# Patient Record
Sex: MALE | Race: WHITE
[De-identification: names, ages, dates, MRNs, and addresses within clinical notes are randomized per-mention and may not be internally consistent; named-entity substitution may affect disease eponyms.]

---

## 2020-02-07 ENCOUNTER — HOSPITAL ENCOUNTER (OUTPATIENT)
Dept: HOSPITAL 88 - NM | Age: 80
End: 2020-02-07
Attending: INTERNAL MEDICINE
Payer: MEDICARE

## 2020-02-07 DIAGNOSIS — R07.9: ICD-10-CM

## 2020-02-07 DIAGNOSIS — I25.10: Primary | ICD-10-CM

## 2020-02-07 PROCEDURE — 93017 CV STRESS TEST TRACING ONLY: CPT

## 2020-02-07 PROCEDURE — 78452 HT MUSCLE IMAGE SPECT MULT: CPT

## 2020-02-07 NOTE — MYOVIEW STRESS TEST
DATE OF STUDY:  02/07/2020 09:07:00

 

Stress Test - Treadmill ONLY

 

STRESS SUMMARY:  The patient underwent pharmacologic stress testing utilizing the usual

protocol for Lexiscan.  Baseline heart rate was 67 beats per minute and increased to a

maximum heart rate of 86 beats per minute.  Resting blood pressure was 129/69 and

remained stable throughout the stress protocol.  The patient was in no cardiac symptoms

throughout the stress protocol. 

 

ELECTROCARDIOGRAPHIC STRESS SUMMARY:  Baseline 12-lead electrocardiogram showed normal

sinus rhythm with nonspecific ST-T wave abnormalities.  Throughout the stress protocol,

the patient had elicited premature ventricular complexes.  There was no evidence of ST

changes throughout the stress protocol. 

 

MYOCARDIAL PERFUSION IMAGES:  The patient received technetium-99m tetrofosmin with 11

mCi at rest and 32.2 mCi at stress.  The perfusion images show a moderate-sized fixed

defect in the inferior wall that improves with stress images.  Gated images reveal left

ventricular ejection fraction of 61%. 

 

CONCLUSIONS:  

1. Normal clinical, hemodynamic, and electrocardiographic Lexiscan stress test.

2. Premature ventricular complexes noted throughout the stress protocol.

3. Myocardial perfusion images revealed moderate-size inferior attenuation artifact,

however, 

cannot rule out nontransmural scar.

4. Normal left ventricular function with an estimated ejection fraction of 61%.

 

 

 

 

______________________________

DO MARYANNE Mcnair/LONL

D:  02/07/2020 15:54:51

T:  02/07/2020 18:38:42

Job #:  [id number]

## 2020-02-20 LAB
BASOPHILS # BLD AUTO: 0.1 10*3/UL (ref 0–0.1)
BASOPHILS NFR BLD AUTO: 0.5 % (ref 0–1)
DEPRECATED NEUTROPHILS # BLD AUTO: 7.5 10*3/UL (ref 2.1–6.9)
EOSINOPHIL # BLD AUTO: 0 10*3/UL (ref 0–0.4)
EOSINOPHIL NFR BLD AUTO: 0.3 % (ref 0–6)
ERYTHROCYTE [DISTWIDTH] IN CORD BLOOD: 13.9 % (ref 11.7–14.4)
HCT VFR BLD AUTO: 41.4 % (ref 38.2–49.6)
HGB BLD-MCNC: 13.8 G/DL (ref 14–18)
LYMPHOCYTES # BLD: 2.6 10*3/UL (ref 1–3.2)
LYMPHOCYTES NFR BLD AUTO: 24.1 % (ref 18–39.1)
MCH RBC QN AUTO: 30.8 PG (ref 28–32)
MCHC RBC AUTO-ENTMCNC: 33.3 G/DL (ref 31–35)
MCV RBC AUTO: 92.4 FL (ref 81–99)
MONOCYTES # BLD AUTO: 0.7 10*3/UL (ref 0.2–0.8)
MONOCYTES NFR BLD AUTO: 6.3 % (ref 4.4–11.3)
NEUTS SEG NFR BLD AUTO: 68.5 % (ref 38.7–80)
PLATELET # BLD AUTO: 294 X10E3/UL (ref 140–360)
RBC # BLD AUTO: 4.48 X10E6/UL (ref 4.3–5.7)

## 2020-02-20 NOTE — DIAGNOSTIC IMAGING REPORT
EXAMINATION:  CHEST 2 VIEWS    



INDICATION:      

^PREOP

^[id number]

^0956





COMPARISON:  None

     

FINDINGS:  PA and lateral views



TUBES and LINES:  None.



LUNGS: Linear consolidation of the lung bases, right greater than left, likely

atelectasis and/or scarring. Small left apical calcified granuloma. No

concerning mass or consolidation.



PLEURA:  No pleural effusion or pneumothorax.



HEART AND MEDIASTINUM: Normal heart size. Normal pulmonary vasculature.

Tortuous and ectatic thoracic aorta.



BONES AND SOFT TISSUES:  No acute osseous lesion.  Sternotomy wires and

mediastinal surgical clips.



UPPER ABDOMEN: No free air under the diaphragm. Elevation of the right

hemidiaphragm.



IMPRESSION: 



1. Bibasilar atelectasis and/or scarring. No acute thoracic abnormality.



2. Nonspecific elevation of the right hemidiaphragm.





Signed by: Srinath Calvo MD on 2/20/2020 10:09 AM

## 2020-02-25 ENCOUNTER — HOSPITAL ENCOUNTER (OUTPATIENT)
Dept: HOSPITAL 88 - OR | Age: 80
Discharge: HOME | End: 2020-02-25
Attending: PLASTIC SURGERY
Payer: MEDICARE

## 2020-02-25 VITALS — SYSTOLIC BLOOD PRESSURE: 96 MMHG | DIASTOLIC BLOOD PRESSURE: 65 MMHG

## 2020-02-25 DIAGNOSIS — Z95.1: ICD-10-CM

## 2020-02-25 DIAGNOSIS — I10: ICD-10-CM

## 2020-02-25 DIAGNOSIS — Z01.812: ICD-10-CM

## 2020-02-25 DIAGNOSIS — Z85.828: ICD-10-CM

## 2020-02-25 DIAGNOSIS — I49.3: ICD-10-CM

## 2020-02-25 DIAGNOSIS — I25.810: ICD-10-CM

## 2020-02-25 DIAGNOSIS — Z79.82: ICD-10-CM

## 2020-02-25 DIAGNOSIS — Z48.3: Primary | ICD-10-CM

## 2020-02-25 DIAGNOSIS — H91.90: ICD-10-CM

## 2020-02-25 DIAGNOSIS — Z01.818: ICD-10-CM

## 2020-02-25 PROCEDURE — 14301 TIS TRNFR ANY 30.1-60 SQ CM: CPT

## 2020-02-25 PROCEDURE — 82948 REAGENT STRIP/BLOOD GLUCOSE: CPT

## 2020-02-25 PROCEDURE — 71046 X-RAY EXAM CHEST 2 VIEWS: CPT

## 2020-02-25 PROCEDURE — 14302 TIS TRNFR ADDL 30 SQ CM: CPT

## 2020-02-25 PROCEDURE — 36415 COLL VENOUS BLD VENIPUNCTURE: CPT

## 2020-02-25 PROCEDURE — 85025 COMPLETE CBC W/AUTO DIFF WBC: CPT

## 2020-02-25 NOTE — XMS REPORT
Summary of Care

                             Created on: 2020



Oh Andrews

External Reference #: [id number]

: 1940

Sex: Male



Demographics







                          Address                   [address] [place] [address] 

[place]  [address]

 

                          Home Phone                [phone]

 

                          Preferred Language        English

 

                          Marital Status            

 

                          Nondenominational Affiliation     Unknown

 

                          Race                      White

 

                          Ethnic Group              Non-





Author







                          Author                    [facility]

 

                          Organization              [facility]

 

                          Address                   Unknown

 

                          Phone                     Unavailable







Support







                Name            Relationship    Address         Phone

 

                Susie Herrera    ECON            Unknown         [phone]







Care Team Providers







                    Care Team Member Name    Role                Phone

 

                    Elaine Koo MD    PCP                 [phone]







Encounter Details







                          Care Team                 Description



                     Date                Type                Department  

 

                                        



Doctor Unassigned, [facility]



[address] [place] [address]                      



                     2019          Orders Only         [facility]  



                                         [address] [place] [address]  







Allergies

No Known Allergiesdocumented as of this encounter (statuses as of 2020)



Medications

No known medicationsdocumented as of this encounter (statuses as of 2020)



Active Problems





No known active problemsdocumented as of this encounter (statuses as of 
2020)



Social History







                                        Date



                 Tobacco Use     Types           Packs/Day       Years Used 

 

                                         



                                         Never Assessed    









 



                           Sex Assigned at Birth     Date Recorded

 

 



                                         Not on file 









                                        Industry



                           Job Start Date            Occupation 

 

                                        Not on file



                           Not on file               Not on file 









                                        Travel End



                           Travel History            Travel Start 

 





                                         No recent travel history available.



documented as of this encounter



Last Filed Vital Signs

Not on filedocumented in this encounter



Plan of Treatment







   



                 Health Maintenance     Due Date        Last Done       Comments

 

   



                           DTaP,Tdap,and Td Vaccines     1952  



                                         (1 - Tdap)   

 

   



                           Zoster Recombinant        1991  



                                         Vaccine (SHINGRIX) (1 of   



                                         2)   

 

   



                           Medicare Wellness Visit     2006  

 

   



                           PNEUMOCOCCAL VACCINES 65+     2006  



                                         (1 of 2 - PCV13)   

 

   



                           INFLUENZA VACCINE (#1)     2019  



documented as of this encounter



Procedures







                                        Comments



                 Procedure Name     Priority        Date/Time       Associated Diagnosis 

 

                                         



                     EXTERNAL PROVIDER RECORDS     Routine             2019  



                                         12:01 AM CST  



documented in this encounter



Results

Not on filedocumented in this encounter

## 2020-02-25 NOTE — XMS REPORT
Summary of Care

                             Created on: 2020



Oh Andrews

External Reference #: [id number]

: 1940

Sex: Male



Demographics







                          Address                   [address] [place]  [address]

 

                          Home Phone                [phone]

 

                          Preferred Language        English

 

                          Marital Status            

 

                          Jew Affiliation     Unknown

 

                          Race                      White

 

                          Ethnic Group              Non-





Author







                          Author                    [facility]

 

                          Organization              [facility]

 

                          Address                   Unknown

 

                          Phone                     Unavailable







Support







                Name            Relationship    Address         Phone

 

                Susie ZHU            Unknown         [phone]







Care Team Providers







                    Care Team Member Name    Role                Phone

 

                    Elaine Koo MD    PCP                 [phone]







Reason for Visit

* 





 



                           Reason                    Comments

 

 



                           New Patient               scalp cancer





*  (Routine)





                          Referred By Contact       Referred To Contact



                 Status          Reason          Specialty       Diagnoses /  



                                         Procedures  

 

                                        



Sher Hilario



[address]



[place] [address]



Phone: [phone]



Fax: [phone]                       



[place] Jackson MD



[address] [facility] [id number] [place] [address]



Phone: [phone]



Fax: [phone]



                     Authorized          [facility]     Diagnoses  



                           RECONSTRUCTIVE            Squamous cell  



                           SURGERY /                 carcinoma of skin,  



                           Plastic Surgery           unspecified  



                                         Eval for wound  



                                         closure - per  



                                         External referral  



                                         from Dr. Sher Hilario - [facility] for  



                                         pt asking who is  



                                         PCP is for  



                                         referral.

  



                                         P  



                                         jacky  



                                         CONSULT PLASTIC  



                                         SURGERY  



                                         NEW VISIT (FIRST  



                                         TIME)  











Encounter Details







                          Care Team                 Description



                     Date                Type                Department  

 

                                        



[place] Jackson MD



[address] [facility] [id number] [place] [address] [phone] (Fax)                      Mohs defect of scalp (Primary Dx)



                     01/15/2020          Office Visit        [facility] Plastic  



                                         Surgery- [facility]  



                                         [address]  



                                         [place], [place]  



                                         [address] [phone]  







Allergies

No Known Allergiesdocumented as of this encounter (statuses as of 2020)



Medications







                          End Date                  Status



              Medication     Sig          Dispensed     Refills      Start  



                                         Date  

 

                                                    Active



                     ramipril 10 mg capsule     Take 10 mg by       0   



                                         mouth daily.     

 

                                                    Active



                     rosuvastatin 5 mg tablet     Take 5 mg by        0   



                                         mouth daily.     

 

                                                    Active



                     ezetimibe 10 mg tablet     Take 10 mg by       0   



                                         mouth daily.     

 

                                                    Active



                     aspirin (ASPIRIN LOW     Take 81 mg by       0   



                           DOSE) 81 mg EC tablet     mouth daily.     



documented as of this encounter (statuses as of 2020)



Active Problems





Not on filedocumented as of this encounter (statuses as of 2020)



Social History







                                        Date



                 Tobacco Use     Types           Packs/Day       Years Used 

 

                                         



                                         Never Smoker    

 

    



                                         Smokeless Tobacco: Never   



                                         Used   









                    Drinks/Week         oz/Week             Comments



                                         Alcohol Use   

 

                                                             



                                         Not Currently   









 



                           Sex Assigned at Birth     Date Recorded

 

 



                                         Not on file 









                                        Industry



                           Job Start Date            Occupation 

 

                                        Not on file



                           Not on file               Not on file 









                                        Travel End



                           Travel History            Travel Start 

 





                                         No recent travel history available.



documented as of this encounter



Last Filed Vital Signs







                    Reading             Time Taken          Comments



                                         Vital Sign   

 

                    126/78              01/15/2020  3:18 PM CST     



                                         Blood Pressure   

 

                    85                  01/15/2020  3:18 PM CST     



                                         Pulse   

 

                    37.4 C (99.3 F)    01/15/2020  3:18 PM CST     



                                         Temperature   

 

                    18                  01/15/2020  3:18 PM CST     



                                         Respiratory Rate   

 

                    95%                 01/15/2020  3:18 PM CST     



                                         Oxygen Saturation   

 

                    -                   -                    



                                         Inhaled Oxygen   



                                         Concentration   

 

                    87.9 kg (193 lb 11.2 oz)    01/15/2020  3:18 PM CST     



                                         Weight   

 

                    180.3 cm (5' 11")    01/15/2020  3:18 PM CST     



                                         Height   

 

                    27.02               01/15/2020  3:18 PM CST     



                                         Body Mass Index   



documented in this encounter



Progress Notes

* Mike Sky MD - 01/15/2020  2:45 PM CST



Formatting of this note might be different from the original.

PLASTIC SURGERY CLINIC NOTE

NAME: Oh Andrews 

MRN: [id number]



Date of Service: 1/15/2020



CC: MOH's defect of scalp



SUBJECT:

Oh Andrews is a 79 year old male who presents today with a MOH's defect of 
the scalp. 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
jose alberto Hilario. The MOH's surgeon attempted to close the scalp defect with a sma
ll open area along the central aspect but following suture removal on 2020
the entire incision dehisced. Following the dehiscence, he has been applying Ne
osporin to the open area and covering it with band aids. Dr. Hilario subsequently
referred the patient for definitive closure. Denies any fevers, chills, nausea 
or vomiting. Of note, the patient currently takes ASA daily. 



CURRENT HOSPITAL MEDICATIONS

Current Outpatient Medications on File Prior to Visit 

Medication Sig Dispense Refill 

 aspirin (ASPIRIN LOW DOSE) 81 mg EC tablet Take 81 mg by mouth daily.   

 ezetimibe 10 mg tablet Take 10 mg by mouth daily.   

 ramipril 10 mg capsule Take 10 mg by mouth daily.   

 rosuvastatin 5 mg tablet Take 5 mg by mouth daily.   



No current facility-administered medications on file prior to visit.  



HISTORY

Past Surgical History: 

Procedure Laterality Date 

 CORONARY ARTERY BYPASS GRAFT   

 6 vessel bypass 

 All other past surgical history non contributory to this encounter.

Past Medical History: 

Diagnosis Date 

 CAD (coronary artery disease)  

 All other past medical history non contributory to this encounter.

Family History 

Problem Relation Age of Onset 

 Stroke Mother  

 All other family history non contributory to this encounter.

Social History 



Socioeconomic History 

 Marital status:  

  Spouse name: Not on file 

 Number of children: Not on file 

 Years of education: Not on file 

 Highest education level: Not on file 

Occupational History 

 Not on file 

Social Needs 

 Financial resource strain: Not on file 

 Food insecurity: 

  Worry: Not on file 

  Inability: Not on file 

 Transportation needs: 

  Medical: Not on file 

  Non-medical: Not on file 

Tobacco Use 

 Smoking status: Never Smoker 

 Smokeless tobacco: Never Used 

Substance and Sexual Activity 

 Alcohol use: Not on file 

 Drug use: Not on file 

 Sexual activity: Not on file 

Lifestyle 

 Physical activity: 

  Days per week: Not on file 

  Minutes per session: Not on file 

 Stress: Not on file 

Relationships 

 Social connections: 

  Talks on phone: Not on file 

  Gets together: Not on file 

  Attends Druze service: Not on file 

  Active member of club or organization: Not on file 

  Attends meetings of clubs or organizations: Not on file 

  Relationship status: Not on file 

 Intimate partner violence: 

  Fear of current or ex partner: Not on file 

  Emotionally abused: Not on file 

  Physically abused: Not on file 

  Forced sexual activity: Not on file 

Other Topics Concern 

 Not on file 

Social History Narrative 

 Not on file 

 All other social history non contributory to this encounter.



REVIEW OF SYSTEMS

Constitutional: Negative.

Head: As per HPI, MOH's defect of scalp

Eyes: Negative

ENT:  Negative

Cardiovascular:Negative

Respiratory: Negative

Gastrointestinal: Negative 

Genitourinary: Negative 

Musculoskeletal: Negative

Skin: Negative

Neuro: Negative 

Endo: Negative

Lymph: Negative

Psych: Negative 

Allergies: No Known Allergies



PHYSICAL EXAM

(-)=Negative,(+)=Positive

/78  | Pulse 85  | Temp 37.4 C (99.3 F)  | Resp 18  | Ht 5' 11" (1.803
m)  | Wt 193 lb 11.2 oz (87.9 kg)  | SpO2 95%  | BMI 27.02 kg/m 

Constitutional: no acute distress

Eyes: vision intact, EOMI

Ears: hearing intact, no trauma to external meatus

Lymphatic:  No palpable adenopathy

Respiratory: No respiratory distress, breathing unlabored

Cardio: Regular rate and rhythm

Neurologic: Sensory Function: Within normal limits

Psych: No evidence of depression or anxiety

Musculoskeletal: Function: Within normal limits, no clubbing, cyanosis or edema

Skin: No bruising, rashes or lesions noted

Head: Open wound, 6 cm, vertex of the scalp, exposed cranium along the wound bed
, surrounded by a crusting rim of fibrinous exudate and nonviable tissue. Surrou
nding skin is erythematous, warm, crusting/flaking with serous drainage, non-TTP
.



LABORATORY/MICROBIOLOGY/PATHOLOGY

No New Labs 



RADIOLOGY

No New Imaging



ASSESSMENT/PLAN

Oh Andrews is a 79 year old male with PMH as seen above who presents with a
MOH's defect of the scalp.



-Lengthy discussion with the patient and his daughter detailing the available quintana
rgical options, risks, benefits, indications, alternative treatment options, and
expected. Specific surgical options discussed include a staged procedure (Integ
ra, wound VAC placement followed by STSG), rotational flap (pinwheel, Yin-Yang),
and bipedicle advancement flap. The patient and his daughter had the opportunity
to ask questions and have them answered to their satisfaction.

-Based upon the appearance of the surrounding skin consistent with a contact olya
matitis, the patient was instructed to discontinue applying Neosporin.

-Apply Vaseline petroleum jelly TID to wound ensuring that the exposed bone is k
ept moist to limit sclerosis.

-PHI release forms filled out to obtain the pathology results from Dr. Sulaiman reagan the medical records/cardiac clearance from the Dr. Jeffry Cervantes (patient's PC
P)

-RTC in 1 week for further discussion regarding the patient's desired surgical o
ption and to allow for adequate healing of the surrounding skin prior to any typ
e of surgical intervention.

-The patient was instructed to call or MyChart with any concerns or questions pr
ior to the next clinic appointment



  ICD-10-CM ICD-9-CM 

1. Mohs defect of scalp M95.2 738.19 



There is no problem list on file for this patient.







Electronically signed by Mike Sky MD at 2020  2:09 PM CST

* Jessica Leigh MA - 01/15/2020  2:45 PM CST



Oh Andrews is a 79 year old male CONSULT TO SCALP CANCER HE IS ALERT AND AM
BULATORY ACCOMPANIED BY HIS DAUGHTER





Electronically signed by Jessica Leigh MA at 2020  2:09 PM CST

documented in this encounter



Plan of Treatment







                          Care Team                 Description



                     Date                Type                Specialty  

 

                                        



[place] Jackson MD



[id number]



[place], [place] [address] [phone] (Fax)                       



                     2020          Office Visit        Plastic Surgery  









   



                 Health Maintenance     Due Date        Last Done       Comments

 

   



                           DTaP,Tdap,and Td Vaccines     1952  



                                         (1 - Tdap)   

 

   



                           Zoster Recombinant        1991  



                                         Vaccine (SHINGRIX) (1 of   



                                         2)   

 

   



                           Medicare Wellness Visit     2006  

 

   



                           PNEUMOCOCCAL VACCINES 65+     2006  



                                         (1 of 2 - PCV13)   

 

   



                           INFLUENZA VACCINE (#1)     2019  



documented as of this encounter



Results

Not on filedocumented in this encounter



Visit Diagnoses











                                         Diagnosis

 





                                         Mohs defect of scalp - Primary



                                         Other specified acquired deformity of head



documented in this encounter



Insurance







                                        Type



            Payer      Benefit     Subscriber ID     Effective     Phone      Address 



                           Plan /                    Dates   



                                         Group     

 

                                        Medicare Adv HMO



                 [employer]     [employer]           [id number]        2020-P   



                           HEALTH                    [place]   



                                         [place]     









     



            Guarantor Name     Account     Relation to     Date of     Phone      Billing Address



                     Type                Patient             Birth  

 

     



            Oh Andrews     Personal/F     Self       1940     [phone] [address]               ([place])              [place] [address]



documented as of this encounter

## 2020-02-25 NOTE — XMS REPORT
Summary of Care

                             Created on: 2020



Oh Andrews

External Reference #: [id number]

: 1940

Sex: Male



Demographics







                          Address                   [address] 

[place]  [address]

 

                          Home Phone                [phone]

 

                          Preferred Language        English

 

                          Marital Status            

 

                          Lutheran Affiliation     Unknown

 

                          Race                      White

 

                          Ethnic Group              Non-





Author







                          Author                    [facility]

 

                          Organization              [facility]

 

                          Address                   Unknown

 

                          Phone                     Unavailable







Support







                Name            Relationship    Address         Phone

 

                Susie ZHU            Unknown         [phone]







Care Team Providers







                    Care Team Member Name    Role                Phone

 

                    Elaine Koo MD    PCP                 [phone]







Reason for Referral

*  (Routine)





                          Referred By Contact       Referred To Contact



                 Status          Reason          Specialty       Diagnoses /  



                                         Procedures  

 

                                        



Fredrick Jackson MD



[address] [address]



Phone: [phone]



Fax: [phone]                       







                     New Request         Neurology           Diagnoses  



                                         Memory difficulty

  



                                         P  



                                         rocedures  



                                         CONSULT NEUROLOGY  











Reason for Visit

* 





 



                           Reason                    Comments

 

 



                           Follow-up                 scalp wound pain of a 3 to his forehead he is accompanied by his daughter







*  (Routine)





                          Referred By Contact       Referred To Contact



                 Status          Reason          Specialty       Diagnoses /  



                                         Procedures  

 

                                        



Sher Hilario



[address] [address]



Phone: [phone]



Fax: [phone]                       



Fredrick Jackson MD



[address] [address]



Phone: [phone]



Fax: [phone]



                     Authorized          [facility]     Diagnoses  



                           RECONSTRUCTIVE            Squamous cell  



                           SURGERY /                 carcinoma of skin,  



                           Plastic Surgery           unspecified  



                                         Eval for wound  



                                         closure - per  



                                         External referral  



                                         from Dr. Sher Hilario - [facility] for  



                                         pt asking who is  



                                         PCP is for  



                                         referral.

  



                                         P  



                                         rocedures  



                                         CONSULT PLASTIC  



                                         SURGERY  



                                         NEW VISIT (FIRST  



                                         TIME)  











Encounter Details







                          Care Team                 Description



                     Date                Type                Department  

 

                                        



Fredrick Jackson MD



[address] [address] [phone] (Fax)                      Mohs defect of scalp (Primary Dx); 

Memory difficulty



                     2020          Office Visit        [facility] Plastic  



                                         Surgery- [address]  



                                         [place]  



                                         [address]  



                                         [phone]  







Allergies

No Known Allergiesdocumented as of this encounter (statuses as of 2020)



Medications







                          End Date                  Status



              Medication     Sig          Dispensed     Refills      Start  



                                         Date  

 

                                                    Active



                     ramipril 10 mg capsule     Take 10 mg by       0   



                                         mouth daily.     

 

                                                    Active



                     rosuvastatin 5 mg tablet     Take 5 mg by        0   



                                         mouth daily.     

 

                                                    Active



                     ezetimibe 10 mg tablet     Take 10 mg by       0   



                                         mouth daily.     

 

                                                    Active



                     aspirin (ASPIRIN LOW     Take 81 mg by       0   



                           DOSE) 81 mg EC tablet     mouth daily.     



documented as of this encounter (statuses as of 2020)



Active Problems





No known active problemsdocumented as of this encounter (statuses as of 
2020)



Social History







                                        Date



                 Tobacco Use     Types           Packs/Day       Years Used 

 

                                         



                                         Never Smoker    

 

    



                                         Smokeless Tobacco: Never   



                                         Used   









                    Drinks/Week         oz/Week             Comments



                                         Alcohol Use   

 

                                                             



                                         Not Currently   









 



                           Sex Assigned at Birth     Date Recorded

 

 



                                         Not on file 









                                        Industry



                           Job Start Date            Occupation 

 

                                        Not on file



                           Not on file               Not on file 









                                        Travel End



                           Travel History            Travel Start 

 





                                         No recent travel history available.



documented as of this encounter



Last Filed Vital Signs







                    Reading             Time Taken          Comments



                                         Vital Sign   

 

                    120/74              2020  2:12 PM CST     



                                         Blood Pressure   

 

                    67                  2020  2:12 PM CST     



                                         Pulse   

 

                    36.8 C (98.3 F)    2020  2:12 PM CST     



                                         Temperature   

 

                    18                  2020  2:12 PM CST     



                                         Respiratory Rate   

 

                    94%                 2020  2:12 PM CST     



                                         Oxygen Saturation   

 

                    -                   -                    



                                         Inhaled Oxygen   



                                         Concentration   

 

                    87.5 kg (193 lb)    2020  2:12 PM CST     



                                         Weight   

 

                    180.3 cm (5' 11")    2020  2:12 PM CST     



                                         Height   

 

                    26.92               2020  2:12 PM CST     



                                         Body Mass Index   



documented in this encounter



Progress Notes

* Myra Trejo RN - 2020  2:30 PM CST



Consent witnessed by MIRTA Trejo RN.



Surgery to be scheduled @ [facility] once pre-d is approved. Patient will be c
ontacted with dates.  Verbal and printed  instructions given. List of medication
s to avoid prior to procedure explained. Verbalized understanding. Facility loca
tion and telephone provided.







Electronically signed by Myra Trejo RN at 2020  6:33 PM CST

* Mike Sky MD - 2020  2:30 PM CST



Formatting of this note might be different from the original.

PLASTIC SURGERY CLINIC NOTE

NAME: Oh Andrews 

MRN: [id number]



Date of Service: 2020



CC: MOH's defect of scalp



SUBJECT:

Oh Andrews is a 79 year old male who presents today for follow up of his MO
H's defect of the scalp. 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
n 2019 with Dr. Hilario. The MOH's surgeon attempted to close the scalp def
ect with a small open area along the central aspect but following suture removal
on 2020 the entire incision dehisced. Following the dehiscence, he was ap
plying Neosporin to the open area and covering it with band aids. At his initial
clinic visit on 1/15/2020, the periwound and surrounding skin had the appearance
of a contact dermatitis, most likely attributable to the neosporin. He reports 
compliance with applying vaseline to the open area. Denies any fevers, chills, 
nausea or vomiting. Of note, the patient currently takes ASA daily. 



CURRENT HOSPITAL MEDICATIONS

Current Outpatient Medications on File Prior to Visit 

Medication Sig Dispense Refill 

 aspirin (ASPIRIN LOW DOSE) 81 mg EC tablet Take 81 mg by mouth daily.   

 ezetimibe 10 mg tablet Take 10 mg by mouth daily.   

 ramipril 10 mg capsule Take 10 mg by mouth daily.   

 rosuvastatin 5 mg tablet Take 5 mg by mouth daily.   



No current facility-administered medications on file prior to visit.  



HISTORY

Past Surgical History: 

Procedure Laterality Date 

 CHOLECYSTECTOMY   

 CORONARY ARTERY BYPASS GRAFT   

 6 vessel bypass 

 All other past surgical history non contributory to this encounter.

Past Medical History: 

Diagnosis Date 

 CAD (coronary artery disease)  

 Gout  

 Hypercholesterolemia  

 Hypertension  

 All other past medical history non contributory to this encounter.

Family History 

Problem Relation Age of Onset 

 Stroke Mother  

 Melanoma Father  

 All other family history non contributory to this encounter.

Social History 



Socioeconomic History 

 Marital status:  

  Spouse name: Not on file 

 Number of children: Not on file 

 Years of education: Not on file 

 Highest education level: Not on file 

Occupational History 

 Not on file 

Social Needs 

 Financial resource strain: Not on file 

 Food insecurity: 

  Worry: Not on file 

  Inability: Not on file 

 Transportation needs: 

  Medical: Not on file 

  Non-medical: Not on file 

Tobacco Use 

 Smoking status: Never Smoker 

 Smokeless tobacco: Never Used 

Substance and Sexual Activity 

 Alcohol use: Not Currently 

 Drug use: Never 

 Sexual activity: Not on file 

Lifestyle 

 Physical activity: 

  Days per week: Not on file 

  Minutes per session: Not on file 

 Stress: Not on file 

Relationships 

 Social connections: 

  Talks on phone: Not on file 

  Gets together: Not on file 

  Attends Christianity service: Not on file 

  Active member of club or organization: Not on file 

  Attends meetings of clubs or organizations: Not on file 

  Relationship status: Not on file 

 Intimate partner violence: 

  Fear of current or ex partner: Not on file 

  Emotionally abused: Not on file 

  Physically abused: Not on file 

  Forced sexual activity: Not on file 

Other Topics Concern 

 Not on file 

Social History Narrative 

 Not on file 

 All other social history non contributory to this encounter.



REVIEW OF SYSTEMS

Constitutional: Negative.

Head: As per HPI, MOH's defect of scalp

Eyes: Negative

ENT:  Negative

Cardiovascular:Negative

Respiratory: Negative

Gastrointestinal: Negative 

Genitourinary: Negative 

Musculoskeletal: Negative

Skin: Negative

Neuro: Negative 

Endo: Negative

Lymph: Negative

Psych: Negative 

Allergies: No Known Allergies



PHYSICAL EXAM

(-)=Negative,(+)=Positive

/74  | Pulse 67  | Temp 36.8 C (98.3 F)  | Resp 18  | Ht 5' 11" (1.803
m)  | Wt 193 lb (87.5 kg)  | SpO2 94%  | BMI 26.92 kg/m 

Constitutional: no acute distress

Eyes: vision intact, EOMI

Ears: hearing intact, no trauma to external meatus

Lymphatic:  No palpable adenopathy

Respiratory: No respiratory distress, breathing unlabored

Cardio: Regular rate and rhythm

Neurologic: Sensory Function: Within normal limits

Psych: No evidence of depression or anxiety

Musculoskeletal: Function: Within normal limits, no clubbing, cyanosis or edema

Skin: No bruising, rashes or lesions noted

Head: Open wound, 6 cm, vertex of the scalp, exposed cranium along the wound bed
, surrounded by a crusting rim of fibrinous exudate. Anterior surrounding skin i
s erythematous and TTP 



LABORATORY/MICROBIOLOGY/PATHOLOGY

No New Labs 



RADIOLOGY

No New Imaging



ASSESSMENT/PLAN

Oh Andrews is a 79 year old male with PMH as seen above who presents with a
MOH's defect of the scalp. The appearance of his wound has improved significant
ly, most importantly, the surrounding skin.



-Lengthy discussion with the patient and his daughter again detailing the availa
ble surgical options, risks, benefits, indications, alternative treatment option
s, and expected. We will plan for a bipedicle advancement flap with skin graftin
g (full thickness vs. Split thickness depending on the size of the defect) of th
e lateral defects. The patient and his daughter had the opportunity to ask quest
ions and have them answered to their satisfaction.

-Consent obtained in clinic. Operative date to be determined pending medical and
cardiac clearance/optimization



Informed consent discussed with the patient, including: condition, proposed care
, treatments and services, alternative forms of treatment, and risks of no treat
ment.  Details discussed around the procedures to be used, and the risks and haz
ards involved, potential benefits, and side effects of the patients proposed 
care, treatment, and services; the likelihood of the patient achieving his or he
r goals; and any potential problems that might occur during recuperation. Reason
able alternative also discussed with the patients proposed care, treatment, a
nd services.  The discussion encompasses risks, benefits, and side effects relat
ed to the alternative and risks related to not receiving the proposed care, alli
tment, and services.



- [facility] email to determine if further clearance/optimization is necessary

- Provided a letter to the patient to give to Dr. Jeffry Cervantes (patient's PCP) 
at his appointment for medical and cardiac clearance/optimization

- Neurology referral placed for memory loss evaluation at the request of the ritu simpson's daughter

- Instructed the patient to continue to apply vaseline to open wound TID

- Patient will be contacted regarding OR date

-The patient was instructed to call or MyChart with any concerns or questions pr
ior to the next clinic appointment



  ICD-10-CM ICD-9-CM 

1. Mohs defect of scalp M95.2 738.19 



There is no problem list on file for this patient.







Electronically signed by Mike Sky MD at 2020  6:29 PM CST

* Jessica Leigh MA - 2020  2:30 PM CST



Oh Andrews is a 79 year old male follow up to scalp wound he is alert and a
mbulatory accompanied by daughter







Electronically signed by Jessica Leigh MA at 2020  6:33 PM CST

documented in this encounter



Plan of Treatment







   



                 Health Maintenance     Due Date        Last Done       Comments

 

   



                           DTaP,Tdap,and Td Vaccines     1952  



                                         (1 - Tdap)   

 

   



                           Zoster Recombinant        1991  



                                         Vaccine (SHINGRIX) (1 of   



                                         2)   

 

   



                           Medicare Wellness Visit     2006  

 

   



                           PNEUMOCOCCAL VACCINES 65+     2006  



                                         (1 of 2 - PCV13)   

 

   



                           INFLUENZA VACCINE (#1)     2019  



documented as of this encounter



Results

Not on filedocumented in this encounter



Visit Diagnoses











                                         Diagnosis

 





                                         Mohs defect of scalp - Primary



                                         Other specified acquired deformity of head

 





                                         Memory difficulty



                                         Memory loss



documented in this encounter



Insurance







                                        Type



            Payer      Benefit     Subscriber ID     Effective     Phone      Address 



                           Plan /                    Dates   



                                         Group     

 

                                        Medicare Adv HMO



                 [facility]           [id number]        2020-P   



                           HEALTH                    [facility]     









     



            Guarantor Name     Account     Relation to     Date of     Phone      Billing Address



                     Type                Patient             Birth  

 

     



            Oh Andrews     Personal/F     Self       1940     [phone] [address]

 [address]               ([place])              [place] [address]



documented as of this encounter

## 2020-02-25 NOTE — XMS REPORT
Summary of Care

                             Created on: 2020



Oh Andrews

External Reference #: [id number]

: 1940

Sex: Male



Demographics







                          Address                   [address] 

[place]  [address]

 

                          Home Phone                [phone]

 

                          Preferred Language        English

 

                          Marital Status            

 

                          Restorationist Affiliation     Unknown

 

                          Race                      White

 

                          Ethnic Group              Non-





Author







                          Author                    [facility]

 

                          Organization              [facility]

 

                          Address                   Unknown

 

                          Phone                     Unavailable







Support







                Name            Relationship    Address         Phone

 

                Susie ZHU            Unknown         [phone]







Care Team Providers







                    Care Team Member Name    Role                Phone

 

                    Elaine Koo MD    PCP                 [phone]







Reason for Referral

*  (Routine)





                          Referred By Contact       Referred To Contact



                 Status          Reason          Specialty       Diagnoses /  



                                         Procedures  

 

                                        



Fredrick Jackson MD



[address] [address]



Phone: [phone]



Fax: [phone]                       







                     New Request         Neurology           Diagnoses  



                                         Memory difficulty

  



                                         P  



                                         rocedures  



                                         CONSULT NEUROLOGY  











Reason for Visit

* 





 



                           Reason                    Comments

 

 



                           Follow-up                 scalp wound pain of a 3 to his forehead he is accompanied by his daughter







*  (Routine)





                          Referred By Contact       Referred To Contact



                 Status          Reason          Specialty       Diagnoses /  



                                         Procedures  

 

                                        



Sher Hilario



[address] [address]



Phone: [phone]



Fax: [phone]                       



Fredrick Jackson MD



[address] [address]



Phone: [phone]



Fax: [phone]



                     Authorized          [facility]     Diagnoses  



                           RECONSTRUCTIVE            Squamous cell  



                           SURGERY /                 carcinoma of skin,  



                           Plastic Surgery           unspecified  



                                         Eval for wound  



                                         closure - per  



                                         External referral  



                                         from Dr. Sher Hilario - [facility] for  



                                         pt asking who is  



                                         PCP is for  



                                         referral.

  



                                         P  



                                         rocedures  



                                         CONSULT PLASTIC  



                                         SURGERY  



                                         NEW VISIT (FIRST  



                                         TIME)  











Encounter Details







                          Care Team                 Description



                     Date                Type                Department  

 

                                        



Fredrick Jackson MD



[address] [address] [phone] (Fax)                      Mohs defect of scalp (Primary Dx); 

Memory difficulty



                     2020          Office Visit        [facility]  



                                         Surgery- [address]  



                                         [place]  



                                         [address]  



                                         [phone]  







Allergies

No Known Allergiesdocumented as of this encounter (statuses as of 2020)



Medications







                          End Date                  Status



              Medication     Sig          Dispensed     Refills      Start  



                                         Date  

 

                                                    Active



                     ramipril 10 mg capsule     Take 10 mg by       0   



                                         mouth daily.     

 

                                                    Active



                     rosuvastatin 5 mg tablet     Take 5 mg by        0   



                                         mouth daily.     

 

                                                    Active



                     ezetimibe 10 mg tablet     Take 10 mg by       0   



                                         mouth daily.     

 

                                                    Active



                     aspirin (ASPIRIN LOW     Take 81 mg by       0   



                           DOSE) 81 mg EC tablet     mouth daily.     



documented as of this encounter (statuses as of 2020)



Active Problems





No known active problemsdocumented as of this encounter (statuses as of 
2020)



Social History







                                        Date



                 Tobacco Use     Types           Packs/Day       Years Used 

 

                                         



                                         Never Smoker    

 

    



                                         Smokeless Tobacco: Never   



                                         Used   









                    Drinks/Week         oz/Week             Comments



                                         Alcohol Use   

 

                                                             



                                         Not Currently   









 



                           Sex Assigned at Birth     Date Recorded

 

 



                                         Not on file 









                                        Industry



                           Job Start Date            Occupation 

 

                                        Not on file



                           Not on file               Not on file 









                                        Travel End



                           Travel History            Travel Start 

 





                                         No recent travel history available.



documented as of this encounter



Last Filed Vital Signs







                    Reading             Time Taken          Comments



                                         Vital Sign   

 

                    120/74              2020  2:12 PM CST     



                                         Blood Pressure   

 

                    67                  2020  2:12 PM CST     



                                         Pulse   

 

                    36.8 C (98.3 F)    2020  2:12 PM CST     



                                         Temperature   

 

                    18                  2020  2:12 PM CST     



                                         Respiratory Rate   

 

                    94%                 2020  2:12 PM CST     



                                         Oxygen Saturation   

 

                    -                   -                    



                                         Inhaled Oxygen   



                                         Concentration   

 

                    87.5 kg (193 lb)    2020  2:12 PM CST     



                                         Weight   

 

                    180.3 cm (5' 11")    2020  2:12 PM CST     



                                         Height   

 

                    26.92               2020  2:12 PM CST     



                                         Body Mass Index   



documented in this encounter



Progress Notes

* Myra Trejo RN - 2020  2:30 PM CST



Consent witnessed by MIRTA Trejo RN.



Surgery to be scheduled @ [facility] once pre-d is approved. Patient will be c
ontacted with dates.  Verbal and printed  instructions given. List of medication
s to avoid prior to procedure explained. Verbalized understanding. Facility loca
tion and telephone provided.







Electronically signed by Myra Trejo RN at 2020  6:33 PM CST

* Mike Sky MD - 2020  2:30 PM CST



Formatting of this note might be different from the original.

PLASTIC SURGERY CLINIC NOTE

NAME: Oh Andrews 

MRN: [id number]



Date of Service: 2020



CC: MOH's defect of scalp



SUBJECT:

Oh Andrews is a 79 year old male who presents today for follow up of his MO
H's defect of the scalp. 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
n 2019 with Dr. Hilario. The MOH's surgeon attempted to close the scalp def
ect with a small open area along the central aspect but following suture removal
on 2020 the entire incision dehisced. Following the dehiscence, he was ap
plying Neosporin to the open area and covering it with band aids. At his initial
clinic visit on 1/15/2020, the periwound and surrounding skin had the appearance
of a contact dermatitis, most likely attributable to the neosporin. He reports 
compliance with applying vaseline to the open area. Denies any fevers, chills, 
nausea or vomiting. Of note, the patient currently takes ASA daily. 



CURRENT HOSPITAL MEDICATIONS

Current Outpatient Medications on File Prior to Visit 

Medication Sig Dispense Refill 

 aspirin (ASPIRIN LOW DOSE) 81 mg EC tablet Take 81 mg by mouth daily.   

 ezetimibe 10 mg tablet Take 10 mg by mouth daily.   

 ramipril 10 mg capsule Take 10 mg by mouth daily.   

 rosuvastatin 5 mg tablet Take 5 mg by mouth daily.   



No current facility-administered medications on file prior to visit.  



HISTORY

Past Surgical History: 

Procedure Laterality Date 

 CHOLECYSTECTOMY   

 CORONARY ARTERY BYPASS GRAFT   

 6 vessel bypass 

 All other past surgical history non contributory to this encounter.

Past Medical History: 

Diagnosis Date 

 CAD (coronary artery disease)  

 Gout  

 Hypercholesterolemia  

 Hypertension  

 All other past medical history non contributory to this encounter.

Family History 

Problem Relation Age of Onset 

 Stroke Mother  

 Melanoma Father  

 All other family history non contributory to this encounter.

Social History 



Socioeconomic History 

 Marital status:  

  Spouse name: Not on file 

 Number of children: Not on file 

 Years of education: Not on file 

 Highest education level: Not on file 

Occupational History 

 Not on file 

Social Needs 

 Financial resource strain: Not on file 

 Food insecurity: 

  Worry: Not on file 

  Inability: Not on file 

 Transportation needs: 

  Medical: Not on file 

  Non-medical: Not on file 

Tobacco Use 

 Smoking status: Never Smoker 

 Smokeless tobacco: Never Used 

Substance and Sexual Activity 

 Alcohol use: Not Currently 

 Drug use: Never 

 Sexual activity: Not on file 

Lifestyle 

 Physical activity: 

  Days per week: Not on file 

  Minutes per session: Not on file 

 Stress: Not on file 

Relationships 

 Social connections: 

  Talks on phone: Not on file 

  Gets together: Not on file 

  Attends [employer] service: Not on file 

  Active member of club or organization: Not on file 

  Attends meetings of clubs or organizations: Not on file 

  Relationship status: Not on file 

 Intimate partner violence: 

  Fear of current or ex partner: Not on file 

  Emotionally abused: Not on file 

  Physically abused: Not on file 

  Forced sexual activity: Not on file 

Other Topics Concern 

 Not on file 

Social History Narrative 

 Not on file 

 All other social history non contributory to this encounter.



REVIEW OF SYSTEMS

Constitutional: Negative.

Head: As per HPI, MOH's defect of scalp

Eyes: Negative

ENT:  Negative

Cardiovascular:Negative

Respiratory: Negative

Gastrointestinal: Negative 

Genitourinary: Negative 

Musculoskeletal: Negative

Skin: Negative

Neuro: Negative 

Endo: Negative

Lymph: Negative

Psych: Negative 

Allergies: No Known Allergies



PHYSICAL EXAM

(-)=Negative,(+)=Positive

/74  | Pulse 67  | Temp 36.8 C (98.3 F)  | Resp 18  | Ht 5' 11" (1.803
m)  | Wt 193 lb (87.5 kg)  | SpO2 94%  | BMI 26.92 kg/m 

Constitutional: no acute distress

Eyes: vision intact, EOMI

Ears: hearing intact, no trauma to external meatus

Lymphatic:  No palpable adenopathy

Respiratory: No respiratory distress, breathing unlabored

Cardio: Regular rate and rhythm

Neurologic: Sensory Function: Within normal limits

Psych: No evidence of depression or anxiety

Musculoskeletal: Function: Within normal limits, no clubbing, cyanosis or edema

Skin: No bruising, rashes or lesions noted

Head: Open wound, 6 cm, vertex of the scalp, exposed cranium along the wound bed
, surrounded by a crusting rim of fibrinous exudate. Anterior surrounding skin i
s erythematous and TTP 



LABORATORY/MICROBIOLOGY/PATHOLOGY

No New Labs 



RADIOLOGY

No New Imaging



ASSESSMENT/PLAN

Oh Andrews is a 79 year old male with PMH as seen above who presents with a
MOH's defect of the scalp. The appearance of his wound has improved significant
ly, most importantly, the surrounding skin.



-Lengthy discussion with the patient and his daughter again detailing the availa
ble surgical options, risks, benefits, indications, alternative treatment option
s, and expected. We will plan for a bipedicle advancement flap with skin graftin
g (full thickness vs. Split thickness depending on the size of the defect) of th
e lateral defects. The patient and his daughter had the opportunity to ask quest
ions and have them answered to their satisfaction.

-Consent obtained in clinic. Operative date to be determined pending medical and
cardiac clearance/optimization



Informed consent discussed with the patient, including: condition, proposed care
, treatments and services, alternative forms of treatment, and risks of no treat
ment.  Details discussed around the procedures to be used, and the risks and haz
ards involved, potential benefits, and side effects of the patients proposed 
care, treatment, and services; the likelihood of the patient achieving his or he
r goals; and any potential problems that might occur during recuperation. Reason
able alternative also discussed with the patients proposed care, treatment, a
nd services.  The discussion encompasses risks, benefits, and side effects relat
ed to the alternative and risks related to not receiving the proposed care, alli
tment, and services.



- [facility] email to determine if further clearance/optimization is necessary

- Provided a letter to the patient to give to Dr. Jeffry Cervantes (patient's PCP) 
at his appointment for medical and cardiac clearance/optimization

- Neurology referral placed for memory loss evaluation at the request of the ritu simpson's daughter

- Instructed the patient to continue to apply vaseline to open wound TID

- Patient will be contacted regarding OR date

-The patient was instructed to call or MyChart with any concerns or questions pr
ior to the next clinic appointment



  ICD-10-CM ICD-9-CM 

1. Mohs defect of scalp M95.2 738.19 



There is no problem list on file for this patient.







Electronically signed by Mike Sky MD at 2020  6:29 PM CST

* Jessica Leigh MA - 2020  2:30 PM CST



Oh Andrews is a 79 year old male follow up to scalp wound he is alert and a
mbulatory accompanied by daughter







Electronically signed by Jessica Leigh MA at 2020  6:33 PM CST

documented in this encounter



Plan of Treatment







   



                 Health Maintenance     Due Date        Last Done       Comments

 

   



                           DTaP,Tdap,and Td Vaccines     1952  



                                         (1 - Tdap)   

 

   



                           Zoster Recombinant        1991  



                                         Vaccine (SHINGRIX) (1 of   



                                         2)   

 

   



                           Medicare Wellness Visit     2006  

 

   



                           PNEUMOCOCCAL VACCINES 65+     2006  



                                         (1 of 2 - PCV13)   

 

   



                           INFLUENZA VACCINE (#1)     2019  



documented as of this encounter



Results

Not on filedocumented in this encounter



Visit Diagnoses











                                         Diagnosis

 





                                         Mohs defect of scalp - Primary



                                         Other specified acquired deformity of head

 





                                         Memory difficulty



                                         Memory loss



documented in this encounter



Insurance







                                        Type



            Payer      Benefit     Subscriber ID     Effective     Phone      Address 



                           Plan /                    Dates   



                                         Group     

 

                                        Medicare Adv HMO



                 [facility] [employer]     [facility]           [id number]        2020-P   



                           HEALTH                    [facility]     









     



            Guarantor Name     Account     Relation to     Date of     Phone      Billing Address



                     Type                Patient             Birth  

 

     



            Oh Andrews     Personal/F     Self       1940     [phone] [address]

 [address]               ([place])              [place] [address]



documented as of this encounter

## 2020-02-25 NOTE — XMS REPORT
Summary of Care

                             Created on: 2020



Oh Andrews

External Reference #: [id number]

: 1940

Sex: Male



Demographics







                          Address                   [address] 

[place]  [address]

 

                          Home Phone                [phone]

 

                          Preferred Language        English

 

                          Marital Status            

 

                          Advent Affiliation     Unknown

 

                          Race                      White

 

                          Ethnic Group              Non-





Author







                          Author                    [facility]

 

                          Organization              [facility]

 

                          Address                   Unknown

 

                          Phone                     Unavailable







Support







                Name            Relationship    Address         Phone

 

                Susie ZHU            Unknown         [phone]







Care Team Providers







                    Care Team Member Name    Role                Phone

 

                    Elaine Koo MD    PCP                 [phone]







Reason for Visit

* 





 



                           Reason                    Comments

 

 



                                         Notification 









Encounter Details







                          Care Team                 Description



                     Date                Type                Department  

 

                                        



Fredrick Jackson MD



[id number]



[place] [address] [phone] (Fax)                      Notification



                     2020          Telephone           [facility]  



                                         [address] [place] [address]  



                                         [address] [phone]  







Allergies

No Known Allergiesdocumented as of this encounter (statuses as of 2020)



Medications







                          End Date                  Status



              Medication     Sig          Dispensed     Refills      Start  



                                         Date  

 

                                                    Active



                     ramipril 10 mg capsule     Take 10 mg by       0   



                                         mouth daily.     

 

                                                    Active



                     rosuvastatin 5 mg tablet     Take 5 mg by        0   



                                         mouth daily.     

 

                                                    Active



                     ezetimibe 10 mg tablet     Take 10 mg by       0   



                                         mouth daily.     

 

                                                    Active



                     aspirin (ASPIRIN LOW     Take 81 mg by       0   



                           DOSE) 81 mg EC tablet     mouth daily.     



documented as of this encounter (statuses as of 2020)



Active Problems





No known active problemsdocumented as of this encounter (statuses as of 
2020)



Social History







                                        Date



                 Tobacco Use     Types           Packs/Day       Years Used 

 

                                         



                                         Never Smoker    

 

    



                                         Smokeless Tobacco: Never   



                                         Used   









                    Drinks/Week         oz/Week             Comments



                                         Alcohol Use   

 

                                                             



                                         Not Currently   









 



                           Sex Assigned at Birth     Date Recorded

 

 



                                         Not on file 









                                        Industry



                           Job Start Date            Occupation 

 

                                        Not on file



                           Not on file               Not on file 









                                        Travel End



                           Travel History            Travel Start 

 





                                         No recent travel history available.



documented as of this encounter



Last Filed Vital Signs

Not on filedocumented in this encounter



Plan of Treatment







   



                 Health Maintenance     Due Date        Last Done       Comments

 

   



                           DTaP,Tdap,and Td Vaccines     1952  



                                         (1 - Tdap)   

 

   



                           Zoster Recombinant        1991  



                                         Vaccine (SHINGRIX) (1 of   



                                         2)   

 

   



                           Medicare Wellness Visit     2006  

 

   



                           PNEUMOCOCCAL VACCINES 65+     2006  



                                         (1 of 2 - PCV13)   

 

   



                           INFLUENZA VACCINE (#1)     2019  



documented as of this encounter



Results

Not on filedocumented in this encounter



Insurance







                                        Type



            Payer      Benefit     Subscriber ID     Effective     Phone      Address 



                           Plan /                    Dates   



                                         Group     

 

                                        Medicare Adv HMO



                 [facility]           [id number]        2020-P   



                           HEALTH                    [place]   



                                         [place]     



documented as of this encounter

## 2020-02-25 NOTE — XMS REPORT
Summary of Care

                             Created on: 2020



Oh Andrews

External Reference #: [id number]

: 1940

Sex: Male



Demographics







                          Address                   [address] [place] [address] 

[place]  [address]

 

                          Home Phone                [phone]

 

                          Preferred Language        English

 

                          Marital Status            

 

                          Anglican Affiliation     Unknown

 

                          Race                      White

 

                          Ethnic Group              Non-





Author







                          Author                    [facility]

 

                          Organization              [facility]

 

                          Address                   Unknown

 

                          Phone                     Unavailable







Support







                Name            Relationship    Address         Phone

 

                Susie ZHU            Unknown         [phone]







Care Team Providers







                    Care Team Member Name    Role                Phone

 

                    Elaine Koo MD    PCP                 [phone]







Encounter Details







                          Care Team                 Description



                     Date                Type                Department  

 

                                        



Doctor Unassigned, [facility]



[address] [place] [address]                      



                     01/15/2019          Orders Only         [facility]  



                                         [address] [place] [address]  







Allergies

No Known Allergiesdocumented as of this encounter (statuses as of 2020)



Medications

No known medicationsdocumented as of this encounter (statuses as of 2020)



Active Problems





Not on filedocumented as of this encounter (statuses as of 2020)



Social History







                                        Date



                 Tobacco Use     Types           Packs/Day       Years Used 

 

                                         



                                         Never Assessed    









 



                           Sex Assigned at Birth     Date Recorded

 

 



                                         Not on file 









                                        Industry



                           Job Start Date            Occupation 

 

                                        Not on file



                           Not on file               Not on file 









                                        Travel End



                           Travel History            Travel Start 

 





                                         No recent travel history available.



documented as of this encounter



Last Filed Vital Signs

Not on filedocumented in this encounter



Plan of Treatment







                          Care Team                 Description



                     Date                Type                Specialty  

 

                                        



Fredrick Jackson MD



[address] [facility] [id number]



[place] [address]



[phone] (Fax)                       



                     2020          Office Visit        Plastic Surgery  









   



                 Health Maintenance     Due Date        Last Done       Comments

 

   



                           DTaP,Tdap,and Td Vaccines     1952  



                                         (1 - Tdap)   

 

   



                           Zoster Recombinant        1991  



                                         Vaccine (SHINGRIX) (1 of   



                                         2)   

 

   



                           Medicare Wellness Visit     2006  

 

   



                           PNEUMOCOCCAL VACCINES 65+     2006  



                                         (1 of 2 - PCV13)   

 

   



                           INFLUENZA VACCINE (#1)     2019  



documented as of this encounter



Procedures







                                        Comments



                 Procedure Name     Priority        Date/Time       Associated Diagnosis 

 

                                         



                     AUTHORIZATION TO RELEASE     Routine             01/15/2019  



                           PHI TO [facility]               12:01 AM CST  



documented in this encounter



Results

Not on filedocumented in this encounter

## 2020-02-25 NOTE — XMS REPORT
Summary of Care

                             Created on: 2020



Oh Andrews

External Reference #: [id number]

: 1940

Sex: Male



Demographics







                          Address                   [address] 

[place]  [address]

 

                          Home Phone                [phone]

 

                          Preferred Language        English

 

                          Marital Status            

 

                          Gnosticism Affiliation     Unknown

 

                          Race                      White

 

                          Ethnic Group              Non-





Author







                          Author                    [facility]

 

                          Organization              [facility]

 

                          Address                   Unknown

 

                          Phone                     Unavailable







Support







                Name            Relationship    Address         Phone

 

                Susie ZHU            Unknown         [phone]







Care Team Providers







                    Care Team Member Name    Role                Phone

 

                    Elaine Koo MD    PCP                 [phone]







Reason for Visit

* 





 



                           Reason                    Comments

 

 



                                         Results 









Encounter Details







                          Care Team                 Description



                     Date                Type                Department  

 

                                        



Mike Sky MD



[address] [place] [address] [phone] (Fax)                      Results



                     2020          Telephone           [facility]  



                                         [facility]  



                                         [address] [facility] [address] [facility] [address]  



                                         [place], [place]  



                                         [address] [phone]  







Allergies

No Known Allergiesdocumented as of this encounter (statuses as of 2020)



Medications







                          End Date                  Status



              Medication     Sig          Dispensed     Refills      Start  



                                         Date  

 

                                                    Active



                     ramipril 10 mg capsule     Take 10 mg by       0   



                                         mouth daily.     

 

                                                    Active



                     rosuvastatin 5 mg tablet     Take 5 mg by        0   



                                         mouth daily.     

 

                                                    Active



                     ezetimibe 10 mg tablet     Take 10 mg by       0   



                                         mouth daily.     

 

                                                    Active



                     aspirin (ASPIRIN LOW     Take 81 mg by       0   



                           DOSE) 81 mg EC tablet     mouth daily.     



documented as of this encounter (statuses as of 2020)



Active Problems





No known active problemsdocumented as of this encounter (statuses as of 
2020)



Social History







                                        Date



                 Tobacco Use     Types           Packs/Day       Years Used 

 

                                         



                                         Never Smoker    

 

    



                                         Smokeless Tobacco: Never   



                                         Used   









                    Drinks/Week         oz/Week             Comments



                                         Alcohol Use   

 

                                                             



                                         Not Currently   









 



                           Sex Assigned at Birth     Date Recorded

 

 



                                         Not on file 









                                        Industry



                           Job Start Date            Occupation 

 

                                        Not on file



                           Not on file               Not on file 









                                        Travel End



                           Travel History            Travel Start 

 





                                         No recent travel history available.



documented as of this encounter



Last Filed Vital Signs

Not on filedocumented in this encounter



Plan of Treatment







   



                 Health Maintenance     Due Date        Last Done       Comments

 

   



                           DTaP,Tdap,and Td Vaccines     1952  



                                         (1 - Tdap)   

 

   



                           Zoster Recombinant        1991  



                                         Vaccine (SHINGRIX) (1 of   



                                         2)   

 

   



                           Medicare Wellness Visit     2006  

 

   



                           PNEUMOCOCCAL VACCINES 65+     2006  



                                         (1 of 2 - PCV13)   

 

   



                           INFLUENZA VACCINE (#1)     2019  



documented as of this encounter



Results

Not on filedocumented in this encounter



Insurance







                                        Type



            Payer      Benefit     Subscriber ID     Effective     Phone      Address 



                           Plan /                    Dates   



                                         Group     

 

                                        Medicare Adv HMO



                 [facility]           [id number]        2020-P   



                           [facility]     



documented as of this encounter

## 2020-02-25 NOTE — XMS REPORT
Summary of Care

                             Created on: 2020



Oh Andrews

External Reference #: [id number]

: 1940

Sex: Male



Demographics







                          Address                   [address] 

[place]  [address]

 

                          Home Phone                [phone]

 

                          Preferred Language        English

 

                          Marital Status            

 

                          Restorationist Affiliation     Unknown

 

                          Race                      White

 

                          Ethnic Group              Non-





Author







                          Author                    [facility]

 

                          Organization              [facility]

 

                          Address                   Unknown

 

                          Phone                     Unavailable







Support







                Name            Relationship    Address         Phone

 

                Susie ZHU            Unknown         [phone]







Care Team Providers







                    Care Team Member Name    Role                Phone

 

                    Elaine Koo MD    PCP                 [phone]







Reason for Visit

* 





 



                           Reason                    Comments

 

 



                                         Notification 









Encounter Details







                          Care Team                 Description



                     Date                Type                Department  

 

                                        



Fredrick Jackson MD



[address] [facility] [id number]



[place] [address] [phone] (Fax)                      Notification



                     2020          Telephone           [facility] Plastic  



                                         Surgery[address] [place] [address]  



                                         [address] [phone]  







Allergies

No Known Allergiesdocumented as of this encounter (statuses as of 2020)



Medications







                          End Date                  Status



              Medication     Sig          Dispensed     Refills      Start  



                                         Date  

 

                                                    Active



                     ramipril 10 mg capsule     Take 10 mg by       0   



                                         mouth daily.     

 

                                                    Active



                     rosuvastatin 5 mg tablet     Take 5 mg by        0   



                                         mouth daily.     

 

                                                    Active



                     ezetimibe 10 mg tablet     Take 10 mg by       0   



                                         mouth daily.     

 

                                                    Active



                     aspirin (ASPIRIN LOW     Take 81 mg by       0   



                           DOSE) 81 mg EC tablet     mouth daily.     



documented as of this encounter (statuses as of 2020)



Active Problems





No known active problemsdocumented as of this encounter (statuses as of 
2020)



Social History







                                        Date



                 Tobacco Use     Types           Packs/Day       Years Used 

 

                                         



                                         Never Smoker    

 

    



                                         Smokeless Tobacco: Never   



                                         Used   









                    Drinks/Week         oz/Week             Comments



                                         Alcohol Use   

 

                                                             



                                         Not Currently   









 



                           Sex Assigned at Birth     Date Recorded

 

 



                                         Not on file 









                                        Industry



                           Job Start Date            Occupation 

 

                                        Not on file



                           Not on file               Not on file 









                                        Travel End



                           Travel History            Travel Start 

 





                                         No recent travel history available.



documented as of this encounter



Last Filed Vital Signs

Not on filedocumented in this encounter



Plan of Treatment







   



                 Health Maintenance     Due Date        Last Done       Comments

 

   



                           DTaP,Tdap,and Td Vaccines     1952  



                                         (1 - Tdap)   

 

   



                           Zoster Recombinant        1991  



                                         Vaccine (SHINGRIX) (1 of   



                                         2)   

 

   



                           Medicare Wellness Visit     2006  

 

   



                           PNEUMOCOCCAL VACCINES 65+     2006  



                                         (1 of 2 - PCV13)   

 

   



                           INFLUENZA VACCINE (#1)     2019  



documented as of this encounter



Results

Not on filedocumented in this encounter



Insurance







                                        Type



            Payer      Benefit     Subscriber ID     Effective     Phone      Address 



                           Plan /                    Dates   



                                         Group     

 

                                        Medicare Adv HMO



                 [facility]           [id number]        2020-P   



                           HEALTH                    resent   



                                         [place]     



documented as of this encounter

## 2020-02-25 NOTE — XMS REPORT
Summary of Care

                             Created on: 01/15/2020



Oh Andrews

External Reference #: [id number]

: 1940

Sex: Male



Demographics







                          Address                   [address] [place] [address] 

[place]  [address]

 

                          Home Phone                [phone]

 

                          Preferred Language        English

 

                          Marital Status            

 

                          Congregation Affiliation     Unknown

 

                          Race                      White

 

                          Ethnic Group              Non-





Author







                          Author                    [facility]

 

                          Organization              [facility]

 

                          Address                   Unknown

 

                          Phone                     Unavailable







Support







                Name            Relationship    Address         Phone

 

                Susie ZHU            Unknown         [phone]







Care Team Providers







                    Care Team Member Name    Role                Phone

 

                    Elaine Koo MD    PCP                 [phone]







Encounter Details







                          Care Team                 Description



                     Date                Type                Department  

 

                                        



Doctor Unassigned, [place]



[address] [place] [address]                      



                     01/15/2020          Orders Only         [facility]  



                                         [address] [place] [address]  







Allergies

Not on Filedocumented as of this encounter (statuses as of 01/15/2020)



Medications

Not on filedocumented as of this encounter (statuses as of 01/15/2020)



Active Problems





Not on filedocumented as of this encounter (statuses as of 01/15/2020)



Social History







                                        Date



                 Tobacco Use     Types           Packs/Day       Years Used 

 

                                         



                                         Never Assessed    









 



                           Sex Assigned at Birth     Date Recorded

 

 



                                         Not on file 









                                        Industry



                           Job Start Date            Occupation 

 

                                        Not on file



                           Not on file               Not on file 









                                        Travel End



                           Travel History            Travel Start 

 





                                         No recent travel history available.



documented as of this encounter



Last Filed Vital Signs

Not on filedocumented in this encounter



Plan of Treatment







                          Care Team                 Description



                     Date                Type                Specialty  

 

                                        



Fredrick Jackson MD



[address] [facility] [id number]



[place] [address]



[phone] (Fax)                       



                     01/15/2020          Office Visit        Plastic Surgery  









   



                 Health Maintenance     Due Date        Last Done       Comments

 

   



                           DTaP,Tdap,and Td Vaccines     1952  



                                         (1 - Tdap)   

 

   



                           Zoster Recombinant        1991  



                                         Vaccine (SHINGRIX) (1 of   



                                         2)   

 

   



                           Medicare Wellness Visit     2006  

 

   



                           PNEUMOCOCCAL VACCINES 65+     2006  



                                         (1 of 2 - PCV13)   

 

   



                           INFLUENZA VACCINE (#1)     2019  



documented as of this encounter



Procedures







                                        Comments



                 Procedure Name     Priority        Date/Time       Associated Diagnosis 

 

                                         



                     EXTERNAL PROVIDER RECORDS     Routine             01/15/2020  



                                         12:01 AM CST  



documented in this encounter



Results

Not on filedocumented in this encounter



Insurance







                                        Type



            Payer      Benefit     Subscriber ID     Effective     Phone      Address 



                           Plan /                    Dates   



                                         Group     

 

                                        Medicare Adv HMO



                 [facility]           [id number]        2020-P   



                           HEALTH                    resent   



                                         [place]     



documented as of this encounter

## 2020-02-25 NOTE — XMS REPORT
Summary of Care

                             Created on: 2020



Oh Andrews

External Reference #: [id number]

: 1940

Sex: Male



Demographics







                          Address                   [address] [place]  [address]

 

                          Home Phone                [phone]

 

                          Preferred Language        English

 

                          Marital Status            

 

                          Spiritism Affiliation     Unknown

 

                          Race                      White

 

                          Ethnic Group              Non-





Author







                          Author                    [facility]

 

                          Organization              [facility]

 

                          Address                   Unknown

 

                          Phone                     Unavailable







Support







                Name            Relationship    Address         Phone

 

                Susie ZHU            Unknown         [phone]







Care Team Providers







                    Care Team Member Name    Role                Phone

 

                    Elaine Koo MD    PCP                 [phone]







Reason for Visit

* 





 



                           Reason                    Comments

 

 



                           New Patient               scalp cancer





*  (Routine)





                          Referred By Contact       Referred To Contact



                 Status          Reason          Specialty       Diagnoses /  



                                         Procedures  

 

                                        



Sher Hilario



[address]



[place] [address]



Phone: [phone]



Fax: [phone]                       



Fredrick Jackson MD



[id number] [place] [address]



Phone: [phone]



Fax: [phone]



                     Authorized          [facility]     Diagnoses  



                           RECONSTRUCTIVE            Squamous cell  



                           SURGERY /                 carcinoma of skin,  



                           Plastic Surgery           unspecified  



                                         Eval for wound  



                                         closure - per  



                                         External referral  



                                         from Dr. Sher Hilario - [facility] for  



                                         pt asking who is  



                                         PCP is for  



                                         referral.

  



                                         P  



                                         jacky  



                                         CONSULT PLASTIC  



                                         SURGERY  



                                         NEW VISIT (FIRST  



                                         TIME)  











Encounter Details







                          Care Team                 Description



                     Date                Type                Department  

 

                                        



Fredrick Jackson MD



[address] [facility] [id number] [place] [address] [phone] (Fax)                      Mohs defect of scalp (Primary Dx)



                     01/15/2020          Office Visit        [facility] Plastic  



                                         Surgery- [facility]  



                                         [address]  



                                         [place]  



                                         [address] [phone]  







Allergies

No Known Allergiesdocumented as of this encounter (statuses as of 2020)



Medications







                          End Date                  Status



              Medication     Sig          Dispensed     Refills      Start  



                                         Date  

 

                                                    Active



                     ramipril 10 mg capsule     Take 10 mg by       0   



                                         mouth daily.     

 

                                                    Active



                     rosuvastatin 5 mg tablet     Take 5 mg by        0   



                                         mouth daily.     

 

                                                    Active



                     ezetimibe 10 mg tablet     Take 10 mg by       0   



                                         mouth daily.     

 

                                                    Active



                     aspirin (ASPIRIN LOW     Take 81 mg by       0   



                           DOSE) 81 mg EC tablet     mouth daily.     



documented as of this encounter (statuses as of 2020)



Active Problems





Not on filedocumented as of this encounter (statuses as of 2020)



Social History







                                        Date



                 Tobacco Use     Types           Packs/Day       Years Used 

 

                                         



                                         Never Smoker    

 

    



                                         Smokeless Tobacco: Never   



                                         Used   









                    Drinks/Week         oz/Week             Comments



                                         Alcohol Use   

 

                                                             



                                         Not Currently   









 



                           Sex Assigned at Birth     Date Recorded

 

 



                                         Not on file 









                                        Industry



                           Job Start Date            Occupation 

 

                                        Not on file



                           Not on file               Not on file 









                                        Travel End



                           Travel History            Travel Start 

 





                                         No recent travel history available.



documented as of this encounter



Last Filed Vital Signs







                    Reading             Time Taken          Comments



                                         Vital Sign   

 

                    126/78              01/15/2020  3:18 PM CST     



                                         Blood Pressure   

 

                    85                  01/15/2020  3:18 PM CST     



                                         Pulse   

 

                    37.4 C (99.3 F)    01/15/2020  3:18 PM CST     



                                         Temperature   

 

                    18                  01/15/2020  3:18 PM CST     



                                         Respiratory Rate   

 

                    95%                 01/15/2020  3:18 PM CST     



                                         Oxygen Saturation   

 

                    -                   -                    



                                         Inhaled Oxygen   



                                         Concentration   

 

                    87.9 kg (193 lb 11.2 oz)    01/15/2020  3:18 PM CST     



                                         Weight   

 

                    180.3 cm (5' 11")    01/15/2020  3:18 PM CST     



                                         Height   

 

                    27.02               01/15/2020  3:18 PM CST     



                                         Body Mass Index   



documented in this encounter



Progress Notes

* Mike Sky MD - 01/15/2020  2:45 PM CST



Formatting of this note might be different from the original.

PLASTIC SURGERY CLINIC NOTE

NAME: Oh Andrews 

MRN: [id number]



Date of Service: 1/15/2020



CC: MOH's defect of scalp



SUBJECT:

Oh Andrews is a 79 year old male who presents today with a MOH's defect of 
the scalp. 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
jose alberto Hilario. The MOH's surgeon attempted to close the scalp defect with a sma
ll open area along the central aspect but following suture removal on 2020
the entire incision dehisced. Following the dehiscence, he has been applying Ne
osporin to the open area and covering it with band aids. Dr. Hilario subsequently
referred the patient for definitive closure. Denies any fevers, chills, nausea 
or vomiting. Of note, the patient currently takes ASA daily. 



CURRENT HOSPITAL MEDICATIONS

Current Outpatient Medications on File Prior to Visit 

Medication Sig Dispense Refill 

 aspirin (ASPIRIN LOW DOSE) 81 mg EC tablet Take 81 mg by mouth daily.   

 ezetimibe 10 mg tablet Take 10 mg by mouth daily.   

 ramipril 10 mg capsule Take 10 mg by mouth daily.   

 rosuvastatin 5 mg tablet Take 5 mg by mouth daily.   



No current facility-administered medications on file prior to visit.  



HISTORY

Past Surgical History: 

Procedure Laterality Date 

 CORONARY ARTERY BYPASS GRAFT   

 6 vessel bypass 

 All other past surgical history non contributory to this encounter.

Past Medical History: 

Diagnosis Date 

 CAD (coronary artery disease)  

 All other past medical history non contributory to this encounter.

Family History 

Problem Relation Age of Onset 

 Stroke Mother  

 All other family history non contributory to this encounter.

Social History 



Socioeconomic History 

 Marital status:  

  Spouse name: Not on file 

 Number of children: Not on file 

 Years of education: Not on file 

 Highest education level: Not on file 

Occupational History 

 Not on file 

Social Needs 

 Financial resource strain: Not on file 

 Food insecurity: 

  Worry: Not on file 

  Inability: Not on file 

 Transportation needs: 

  Medical: Not on file 

  Non-medical: Not on file 

Tobacco Use 

 Smoking status: Never Smoker 

 Smokeless tobacco: Never Used 

Substance and Sexual Activity 

 Alcohol use: Not on file 

 Drug use: Not on file 

 Sexual activity: Not on file 

Lifestyle 

 Physical activity: 

  Days per week: Not on file 

  Minutes per session: Not on file 

 Stress: Not on file 

Relationships 

 Social connections: 

  Talks on phone: Not on file 

  Gets together: Not on file 

  Attends [employer] service: Not on file 

  Active member of club or organization: Not on file 

  Attends meetings of clubs or organizations: Not on file 

  Relationship status: Not on file 

 Intimate partner violence: 

  Fear of current or ex partner: Not on file 

  Emotionally abused: Not on file 

  Physically abused: Not on file 

  Forced sexual activity: Not on file 

Other Topics Concern 

 Not on file 

Social History Narrative 

 Not on file 

 All other social history non contributory to this encounter.



REVIEW OF SYSTEMS

Constitutional: Negative.

Head: As per HPI, MOH's defect of scalp

Eyes: Negative

ENT:  Negative

Cardiovascular:Negative

Respiratory: Negative

Gastrointestinal: Negative 

Genitourinary: Negative 

Musculoskeletal: Negative

Skin: Negative

Neuro: Negative 

Endo: Negative

Lymph: Negative

Psych: Negative 

Allergies: No Known Allergies



PHYSICAL EXAM

(-)=Negative,(+)=Positive

/78  | Pulse 85  | Temp 37.4 C (99.3 F)  | Resp 18  | Ht 5' 11" (1.803
m)  | Wt 193 lb 11.2 oz (87.9 kg)  | SpO2 95%  | BMI 27.02 kg/m 

Constitutional: no acute distress

Eyes: vision intact, EOMI

Ears: hearing intact, no trauma to external meatus

Lymphatic:  No palpable adenopathy

Respiratory: No respiratory distress, breathing unlabored

Cardio: Regular rate and rhythm

Neurologic: Sensory Function: Within normal limits

Psych: No evidence of depression or anxiety

Musculoskeletal: Function: Within normal limits, no clubbing, cyanosis or edema

Skin: No bruising, rashes or lesions noted

Head: Open wound, 6 cm, vertex of the scalp, exposed cranium along the wound bed
, surrounded by a crusting rim of fibrinous exudate and nonviable tissue. Surrou
nding skin is erythematous, warm, crusting/flaking with serous drainage, non-TTP
.



LABORATORY/MICROBIOLOGY/PATHOLOGY

No New Labs 



RADIOLOGY

No New Imaging



ASSESSMENT/PLAN

Oh Andrews is a 79 year old male with PMH as seen above who presents with a
MOH's defect of the scalp.



-Lengthy discussion with the patient and his daughter detailing the available quintana
rgical options, risks, benefits, indications, alternative treatment options, and
expected. Specific surgical options discussed include a staged procedure (Integ
ra, wound VAC placement followed by STSG), rotational flap (pinwheel, Yin-Yang),
and bipedicle advancement flap. The patient and his daughter had the opportunity
to ask questions and have them answered to their satisfaction.

-Based upon the appearance of the surrounding skin consistent with a contact olya
matitis, the patient was instructed to discontinue applying Neosporin.

-Apply Vaseline petroleum jelly TID to wound ensuring that the exposed bone is k
ept moist to limit sclerosis.

-PHI release forms filled out to obtain the pathology results from Dr. Sulaiman reagan the medical records/cardiac clearance from the Dr. Jeffry Cervantes (patient's PC
P)

-RTC in 1 week for further discussion regarding the patient's desired surgical o
ption and to allow for adequate healing of the surrounding skin prior to any typ
e of surgical intervention.

-The patient was instructed to call or MyChart with any concerns or questions pr
ior to the next clinic appointment



  ICD-10-CM ICD-9-CM 

1. Mohs defect of scalp M95.2 738.19 



There is no problem list on file for this patient.







Electronically signed by Mike Sky MD at 2020  2:09 PM CST

* Jessica Leigh MA - 01/15/2020  2:45 PM CST



Oh Andrews is a 79 year old male CONSULT TO SCALP CANCER HE IS ALERT AND AM
BULATORY ACCOMPANIED BY HIS DAUGHTER





Electronically signed by Jessica Leigh MA at 2020  2:09 PM CST

documented in this encounter



Plan of Treatment







                          Care Team                 Description



                     Date                Type                Specialty  

 

                                        



Fredrick Jackson MD



[id number]



[place], [place] [address] [phone] (Fax)                       



                     2020          Office Visit        Plastic Surgery  









   



                 Health Maintenance     Due Date        Last Done       Comments

 

   



                           DTaP,Tdap,and Td Vaccines     1952  



                                         (1 - Tdap)   

 

   



                           Zoster Recombinant        1991  



                                         Vaccine (SHINGRIX) (1 of   



                                         2)   

 

   



                           Medicare Wellness Visit     2006  

 

   



                           PNEUMOCOCCAL VACCINES 65+     2006  



                                         (1 of 2 - PCV13)   

 

   



                           INFLUENZA VACCINE (#1)     2019  



documented as of this encounter



Results

Not on filedocumented in this encounter



Visit Diagnoses











                                         Diagnosis

 





                                         Mohs defect of scalp - Primary



                                         Other specified acquired deformity of head



documented in this encounter



Insurance







                                        Type



            Payer      Benefit     Subscriber ID     Effective     Phone      Address 



                           Plan /                    Dates   



                                         Group     

 

                                        Medicare Adv HMO



                 [employer] [facility]     [employer]           [id number]        2020-P   



                           HEALTH                    [place]   



                                         [place]     









     



            Guarantor Name     Account     Relation to     Date of     Phone      Billing Address



                     Type                Patient             Birth  

 

     



            Oh Andrews     Personal/F     Self       1940     [phone] [address]               ([place])              [place] [address]



documented as of this encounter

## 2020-02-25 NOTE — XMS REPORT
Summary of Care

                             Created on: 2020



Oh Andrews

External Reference #: [id number]

: 1940

Sex: Male



Demographics







                          Address                   [address] [place]  [address]

 

                          Home Phone                [phone]

 

                          Preferred Language        English

 

                          Marital Status            

 

                          Jewish Affiliation     Unknown

 

                          Race                      White

 

                          Ethnic Group              Non-





Author







                          Author                    [facility]

 

                          Organization              [facility]

 

                          Address                   Unknown

 

                          Phone                     Unavailable







Support







                Name            Relationship    Address         Phone

 

                Susie ZHU            Unknown         [phone]







Care Team Providers







                    Care Team Member Name    Role                Phone

 

                    Elaine Koo MD    PCP                 [phone]







Reason for Visit

* 





 



                           Reason                    Comments

 

 



                           New Patient               scalp cancer





*  (Routine)





                          Referred By Contact       Referred To Contact



                 Status          Reason          Specialty       Diagnoses /  



                                         Procedures  

 

                                        



Sher Hilario



[address]



[facility] [address]



[place] [address]



Phone: [phone]



Fax: [phone]                       



Fredrick Jackson MD



[address] [address]



Phone: [phone]



Fax: [phone]



                     Authorized          [facility]     Diagnoses  



                           RECONSTRUCTIVE            Squamous cell  



                           SURGERY /                 carcinoma of skin,  



                           Plastic Surgery           unspecified  



                                         Eval for wound  



                                         closure - per  



                                         External referral  



                                         from Dr. Sher Hilario - [facility] for  



                                         pt asking who is  



                                         PCP is for  



                                         referral.

  



                                         P  



                                         jacky  



                                         CONSULT PLASTIC  



                                         SURGERY  



                                         NEW VISIT (FIRST  



                                         TIME)  











Encounter Details







                          Care Team                 Description



                     Date                Type                Department  

 

                                        



Fredrick Jackson MD



[address] [facility] [address] [place] [address] [phone] (Fax)                      Mohs defect of scalp (Primary Dx)



                     01/15/2020          Office Visit        [facility] Plastic  



                                         Surgery- [facility]  



                                         [address]  



                                         [place]  



                                         [address] [phone]  







Allergies

No Known Allergiesdocumented as of this encounter (statuses as of 2020)



Medications







                          End Date                  Status



              Medication     Sig          Dispensed     Refills      Start  



                                         Date  

 

                                                    Active



                     ramipril 10 mg capsule     Take 10 mg by       0   



                                         mouth daily.     

 

                                                    Active



                     rosuvastatin 5 mg tablet     Take 5 mg by        0   



                                         mouth daily.     

 

                                                    Active



                     ezetimibe 10 mg tablet     Take 10 mg by       0   



                                         mouth daily.     

 

                                                    Active



                     aspirin (ASPIRIN LOW     Take 81 mg by       0   



                           DOSE) 81 mg EC tablet     mouth daily.     



documented as of this encounter (statuses as of 2020)



Active Problems





No known active problemsdocumented as of this encounter (statuses as of 
2020)



Social History







                                        Date



                 Tobacco Use     Types           Packs/Day       Years Used 

 

                                         



                                         Never Smoker    

 

    



                                         Smokeless Tobacco: Never   



                                         Used   









                    Drinks/Week         oz/Week             Comments



                                         Alcohol Use   

 

                                                             



                                         Not Currently   









 



                           Sex Assigned at Birth     Date Recorded

 

 



                                         Not on file 









                                        Industry



                           Job Start Date            Occupation 

 

                                        Not on file



                           Not on file               Not on file 









                                        Travel End



                           Travel History            Travel Start 

 





                                         No recent travel history available.



documented as of this encounter



Last Filed Vital Signs







                    Reading             Time Taken          Comments



                                         Vital Sign   

 

                    126/78              01/15/2020  3:18 PM CST     



                                         Blood Pressure   

 

                    85                  01/15/2020  3:18 PM CST     



                                         Pulse   

 

                    37.4 C (99.3 F)    01/15/2020  3:18 PM CST     



                                         Temperature   

 

                    18                  01/15/2020  3:18 PM CST     



                                         Respiratory Rate   

 

                    95%                 01/15/2020  3:18 PM CST     



                                         Oxygen Saturation   

 

                    -                   -                    



                                         Inhaled Oxygen   



                                         Concentration   

 

                    87.9 kg (193 lb 11.2 oz)    01/15/2020  3:18 PM CST     



                                         Weight   

 

                    180.3 cm (5' 11")    01/15/2020  3:18 PM CST     



                                         Height   

 

                    27.02               01/15/2020  3:18 PM CST     



                                         Body Mass Index   



documented in this encounter



Progress Notes

* Fredrick Jackson MD - 01/15/2020  2:45 PM CST



I personally examined the patient on 20 and agree with Dr. dias's resident
note with the following addition(s): decision for surg. - needs to clear likely 
rash due to neosporin - will pre-d and post after rtc to ensure good skin  .  I 
actively participated in the decision-making process.  Please see the resident's
note for additional details. 



 



Electronically signed by Fredrick Jackson MD at 2020 12:11 PM CST

* Mike Dias MD - 01/15/2020  2:45 PM CST



Formatting of this note might be different from the original.

PLASTIC SURGERY CLINIC NOTE

NAME: Oh Andrews 

MRN: [id number]



Date of Service: 1/15/2020



CC: MOH's defect of scalp



SUBJECT:

Oh Andrews is a 79 year old male who presents today with a MOH's defect of 
the scalp. 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
jose alberto Hilario. The MOH's surgeon attempted to close the scalp defect with a sma
ll open area along the central aspect but following suture removal on 2020
the entire incision dehisced. Following the dehiscence, he has been applying Ne
osporin to the open area and covering it with band aids. Dr. Hilario subsequently
referred the patient for definitive closure. Denies any fevers, chills, nausea 
or vomiting. Of note, the patient currently takes ASA daily. 



CURRENT HOSPITAL MEDICATIONS

Current Outpatient Medications on File Prior to Visit 

Medication Sig Dispense Refill 

 aspirin (ASPIRIN LOW DOSE) 81 mg EC tablet Take 81 mg by mouth daily.   

 ezetimibe 10 mg tablet Take 10 mg by mouth daily.   

 ramipril 10 mg capsule Take 10 mg by mouth daily.   

 rosuvastatin 5 mg tablet Take 5 mg by mouth daily.   



No current facility-administered medications on file prior to visit.  



HISTORY

Past Surgical History: 

Procedure Laterality Date 

 CORONARY ARTERY BYPASS GRAFT   

 6 vessel bypass 

 All other past surgical history non contributory to this encounter.

Past Medical History: 

Diagnosis Date 

 CAD (coronary artery disease)  

 All other past medical history non contributory to this encounter.

Family History 

Problem Relation Age of Onset 

 Stroke Mother  

 All other family history non contributory to this encounter.

Social History 



Socioeconomic History 

 Marital status:  

  Spouse name: Not on file 

 Number of children: Not on file 

 Years of education: Not on file 

 Highest education level: Not on file 

Occupational History 

 Not on file 

Social Needs 

 Financial resource strain: Not on file 

 Food insecurity: 

  Worry: Not on file 

  Inability: Not on file 

 Transportation needs: 

  Medical: Not on file 

  Non-medical: Not on file 

Tobacco Use 

 Smoking status: Never Smoker 

 Smokeless tobacco: Never Used 

Substance and Sexual Activity 

 Alcohol use: Not on file 

 Drug use: Not on file 

 Sexual activity: Not on file 

Lifestyle 

 Physical activity: 

  Days per week: Not on file 

  Minutes per session: Not on file 

 Stress: Not on file 

Relationships 

 Social connections: 

  Talks on phone: Not on file 

  Gets together: Not on file 

  Attends [employer] service: Not on file 

  Active member of club or organization: Not on file 

  Attends meetings of clubs or organizations: Not on file 

  Relationship status: Not on file 

 Intimate partner violence: 

  Fear of current or ex partner: Not on file 

  Emotionally abused: Not on file 

  Physically abused: Not on file 

  Forced sexual activity: Not on file 

Other Topics Concern 

 Not on file 

Social History Narrative 

 Not on file 

 All other social history non contributory to this encounter.



REVIEW OF SYSTEMS

Constitutional: Negative.

Head: As per HPI, MOH's defect of scalp

Eyes: Negative

ENT:  Negative

Cardiovascular:Negative

Respiratory: Negative

Gastrointestinal: Negative 

Genitourinary: Negative 

Musculoskeletal: Negative

Skin: Negative

Neuro: Negative 

Endo: Negative

Lymph: Negative

Psych: Negative 

Allergies: No Known Allergies



PHYSICAL EXAM

(-)=Negative,(+)=Positive

/78  | Pulse 85  | Temp 37.4 C (99.3 F)  | Resp 18  | Ht 5' 11" (1.803
m)  | Wt 193 lb 11.2 oz (87.9 kg)  | SpO2 95%  | BMI 27.02 kg/m 

Constitutional: no acute distress

Eyes: vision intact, EOMI

Ears: hearing intact, no trauma to external meatus

Lymphatic:  No palpable adenopathy

Respiratory: No respiratory distress, breathing unlabored

Cardio: Regular rate and rhythm

Neurologic: Sensory Function: Within normal limits

Psych: No evidence of depression or anxiety

Musculoskeletal: Function: Within normal limits, no clubbing, cyanosis or edema

Skin: No bruising, rashes or lesions noted

Head: Open wound, 6 cm, vertex of the scalp, exposed cranium along the wound bed
, surrounded by a crusting rim of fibrinous exudate and nonviable tissue. Surrou
nding skin is erythematous, warm, crusting/flaking with serous drainage, non-TTP
.



LABORATORY/MICROBIOLOGY/PATHOLOGY

No New Labs 



RADIOLOGY

No New Imaging



ASSESSMENT/PLAN

Oh Andrews is a 79 year old male with PMH as seen above who presents with a
MOH's defect of the scalp.



-Lengthy discussion with the patient and his daughter detailing the available quintana
rgical options, risks, benefits, indications, alternative treatment options, and
expected. Specific surgical options discussed include a staged procedure (Integ
ra, wound VAC placement followed by STSG), rotational flap (pinwheel, Yin-Yang),
and bipedicle advancement flap. The patient and his daughter had the opportunity
to ask questions and have them answered to their satisfaction.

-Based upon the appearance of the surrounding skin consistent with a contact olya
matitis, the patient was instructed to discontinue applying Neosporin.

-Apply Vaseline petroleum jelly TID to wound ensuring that the exposed bone is k
ept moist to limit sclerosis.

-PHI release forms filled out to obtain the pathology results from Dr. Sulaiman reagan the medical records/cardiac clearance from the Dr. Jeffry Cervantes (patient's PC
P)

-RTC in 1 week for further discussion regarding the patient's desired surgical o
ption and to allow for adequate healing of the surrounding skin prior to any typ
e of surgical intervention.

-The patient was instructed to call or MyChart with any concerns or questions pr
ior to the next clinic appointment



  ICD-10-CM ICD-9-CM 

1. Mohs defect of scalp M95.2 738.19 



There is no problem list on file for this patient.







Electronically signed by Mike Dias MD at 2020  2:09 PM CST

* Jessica Leigh MA - 01/15/2020  2:45 PM CST



Oh Andrews is a 79 year old male CONSULT TO SCALP CANCER HE IS ALERT AND AM
BULATORY ACCOMPANIED BY HIS DAUGHTER





Electronically signed by Jessica Leigh MA at 2020  2:09 PM CST

documented in this encounter



Plan of Treatment







   



                 Health Maintenance     Due Date        Last Done       Comments

 

   



                           DTaP,Tdap,and Td Vaccines     1952  



                                         (1 - Tdap)   

 

   



                           Zoster Recombinant        1991  



                                         Vaccine (SHINGRIX) (1 of   



                                         2)   

 

   



                           Medicare Wellness Visit     2006  

 

   



                           PNEUMOCOCCAL VACCINES 65+     2006  



                                         (1 of 2 - PCV13)   

 

   



                           INFLUENZA VACCINE (#1)     2019  



documented as of this encounter



Results

Not on filedocumented in this encounter



Visit Diagnoses











                                         Diagnosis

 





                                         Mohs defect of scalp - Primary



                                         Other specified acquired deformity of head



documented in this encounter



Insurance







                                        Type



            Payer      Benefit     Subscriber ID     Effective     Phone      Address 



                           Plan /                    Dates   



                                         Group     

 

                                        Medicare Adv HMO



                 [employer]     [employer]           [id number]        2020-P   



                           HEALTH                    resent   



                                         [place]     









     



            Guarantor Name     Account     Relation to     Date of     Phone      Billing Address



                     Type                Patient             Birth  

 

     



            Oh Andrews     Personal/F     Self       1940     [phone] [address]               ([place])              [place] [address]



documented as of this encounter

## 2020-02-25 NOTE — XMS REPORT
Patient Summary Document

                             Created on: 2020



DEA MURILLO

External Reference #: [id number]

: 1940

Sex: Male



Demographics







                          Address                   [address] [place]  [address]

 

                          Home Phone                [phone]

 

                          Preferred Language        Unknown

 

                          Marital Status            Unknown

 

                          Worship Affiliation     Unknown

 

                          Race                      Unknown

 

                                        Additional Race(s)  

 

                          Ethnic Group              Unknown





Author







                          [facility]                    [facility]

 

                          [facility]

 

                          Address                   Unknown

 

                          Phone                     Unavailable







Care Team Providers







                    Care Team Member Name    Role                Phone

 

                    JOSH PRITCHETT    Unavailable         Unavailable







Problems

This patient has no known problems.



Allergies, Adverse Reactions, Alerts

This patient has no known allergies or adverse reactions.



Medications

This patient has no known medications.



Results







           Test Description    Test Time    Test Comments    Text Results    Atomic Results    Result

 Comments

 

                CHEST 2 VIEWS    2020 10:07:00                                                             

                                             [place] [address]  
   Patient Name: DEA MURILLO JR                                   MR #: 
[id number]                     : 1940                                  
Age/Sex: 80/M  Acct #: [id number]                              Req #: [id number]
[id number]  [facility] Physician:                                                      
Ordered by: JOSH PRITCHETT MD                            Report #: [id number]  
     Location: OR                                      Room/Bed:                
    
___________________________________________________________________________________________________
   Procedure: [id number] DX/CHEST 2 VIEWS  Exam Date: 20                   
        Exam Time: 56                                              REPORT 
STATUS: Signed    EXAMINATION:  CHEST 2 VIEWS          INDICATION:          
PREOP    2020         COMPARISON:  None           FINDINGS:  PA and 
lateral views      TUBES and LINES:  None.      LUNGS: Linear consolidation of 
the lung bases, right greater than left, likely   atelectasis and/or scarring. 
Small left apical calcified granuloma. No   concerning mass or consolidation.   
  PLEURA:  No pleural effusion or pneumothorax.      HEART AND MEDIASTINUM: Norm
al heart size. Normal pulmonary vasculature.   Tortuous and ectatic thoracic 
aorta.      BONES AND SOFT TISSUES:  No acute osseous lesion.  Sternotomy wires 
and   mediastinal surgical clips.      UPPER ABDOMEN: No free air under the 
diaphragm. Elevation of the right   hemidiaphragm.      IMPRESSION:       1. 
Bibasilar atelectasis and/or scarring. No acute thoracic abnormality.      2. 
Nonspecific elevation of the right hemidiaphragm.         Signed by: Alivia Gonzalez MD on 2020 10:09 AM        Dictated By: ALIVIA GONZALEZ MD  
Electronically Signed By: ALIVIA GONZALEZ MD on 20 100  Transcribed By: 
KIRK on 20 100       COPY TO:   JOSH PRITCHETT MD

## 2020-02-25 NOTE — OPERATIVE REPORT
DATE OF PROCEDURE:  02/25/2020

 

SURGEON:  Fredrick Burroughs MD

 

PREOPERATIVE DIAGNOSES:  

1. Squamous cell carcinoma of scalp.

2. Mohs defect of scalp 40 cm2.

3. Questionable second-degree burn versus rash of scalp.

 

POSTOPERATIVE DIAGNOSES:  

1. Squamous cell carcinoma of scalp.

2. Mohs defect of scalp 40 cm2.

3. Questionable second-degree burn versus rash of scalp.

 

PROCEDURES:  

1. Bitemporal pedicle advancement flaps, 180 cm2.

2. Debridement of soft tissues and calvarium.

 

ANESTHESIA:  General.

 

HISTORY:  The patient is a 80-year-old male who back in November, underwent Mohs

micrographic surgery for removal of a large squamous cell carcinoma on the scalp.  This

resulted in a defect, which was to be closed by Plastic Surgery back in November.  The

family states that the plastic surgeon that they saw was not in network and the surgery

was unable to be scheduled.  I saw the patient approximately two weeks ago for the 1st

time in the office and discussed the risks, benefits, and alternatives to treatment to

close this wound.  This morning, the patient shows up for surgery and the scalp

immediately around the area of the defect shows a partial-thickness epidermolysis with

irritation and bleeding.  The patient is unable to state how or when this occurred.  The

patient's family consisting of two daughters are unaware and there was concern about

this recent change in exam.  The risks and benefits of going forward with the procedure

because of this unknown skin condition was discussed in detail and the decision was made

to proceed with the procedure as planned. 

 

PROCEDURE IN DETAIL:  The patient was marked preoperatively in the holding area.  He was

brought to the operating theater and after the induction of adequate general anesthesia,

he was prepped and draped in a supine position and a time-out was performed.  The

procedure was 1st begun by sharply excising the wound edges to remove all the

devitalized and colonized tissue.  At this point, a bur is used to grind down the

superficial portion of the outer cortex and remove all the desiccated bone that has

accumulated since November.  At this point bitemporal flaps were marked out.  The width

was no less than 6 cm.  The pedicles were approximately 15 cm long.  The skin flap is 90

cm2 or 180 cm2 for the total reconstruction.  The proposed incision lines were then

infiltrated with 1% Xylocaine with epinephrine.  After waiting an appropriate amount of

time for maximum vasoconstrictive effect, the incisions were made through the skin and

subcutaneous tissue and bleeding was controlled using electrocautery.  The incisions

were taken down through the galea but not through the periosteum.  The flaps were then

dissected off the periosteum all the way to their bases.  At this point, hemostasis was

made absolute using the electrocautery.  The flaps were then advanced towards the

midline of the wound and they were secured in place temporarily using surgical clips.

At this point from the bases of the incision towards the vertex of this scalp, 4-0 nylon

sutures were placed in an interrupted horizontal mattress fashion to start closing the

wounds.  In the region of the central aspect of the wound where the skin flaps

overlapped, the redundant skin and subcutaneous tissue was excised and then the

remainder of the incision closed with 4-0 nylon in interrupted horizontal mattress

fashion.  At the completion of the reconstruction, the flaps appeared to be well

vascularized.  Bactroban ointment, Xeroform gauze, and a sterile dressing were applied

to the calvarium.  The estimated blood loss of procedure was approximately 100-125 mL.

The patient was returned to recovery room in satisfactory condition and discharged with

a postoperative instruction sheet as well as a followup appointment. 

 

 

 

 

______________________________

MD ANGELIC Toth/CATINA

D:  02/25/2020 10:17:09

T:  02/25/2020 17:38:58

Job #:  [id number]